# Patient Record
Sex: FEMALE | Race: BLACK OR AFRICAN AMERICAN | NOT HISPANIC OR LATINO | Employment: FULL TIME | URBAN - METROPOLITAN AREA
[De-identification: names, ages, dates, MRNs, and addresses within clinical notes are randomized per-mention and may not be internally consistent; named-entity substitution may affect disease eponyms.]

---

## 2023-02-23 LAB
CHLAMYDIA TRACH., AMPLIFIED: NEGATIVE
N. GONORRHEA, AMPLIFIED: NEGATIVE
TRICHOMONAS VAGINALIS: NEGATIVE

## 2023-04-04 ENCOUNTER — OFFICE VISIT (OUTPATIENT)
Dept: PRIMARY CARE | Facility: CLINIC | Age: 43
End: 2023-04-04
Payer: COMMERCIAL

## 2023-04-04 VITALS — SYSTOLIC BLOOD PRESSURE: 128 MMHG | DIASTOLIC BLOOD PRESSURE: 80 MMHG | WEIGHT: 293 LBS | BODY MASS INDEX: 49.07 KG/M2

## 2023-04-04 DIAGNOSIS — I10 HYPERTENSION, UNSPECIFIED TYPE: ICD-10-CM

## 2023-04-04 DIAGNOSIS — R79.89 LOW VITAMIN D LEVEL: ICD-10-CM

## 2023-04-04 DIAGNOSIS — R53.83 FATIGUE, UNSPECIFIED TYPE: Primary | ICD-10-CM

## 2023-04-04 DIAGNOSIS — E78.5 HYPERLIPIDEMIA, UNSPECIFIED HYPERLIPIDEMIA TYPE: ICD-10-CM

## 2023-04-04 DIAGNOSIS — N94.6 MENSTRUAL PAIN: ICD-10-CM

## 2023-04-04 DIAGNOSIS — F43.10 PTSD (POST-TRAUMATIC STRESS DISORDER): ICD-10-CM

## 2023-04-04 DIAGNOSIS — F41.9 ANXIETY DISORDER, UNSPECIFIED TYPE: ICD-10-CM

## 2023-04-04 DIAGNOSIS — T78.40XD ALLERGY, SUBSEQUENT ENCOUNTER: ICD-10-CM

## 2023-04-04 PROBLEM — D55.0 GLUCOSE-6-PHOSPHATE DEHYDROGENASE (G6PD) DEFICIENCY ANEMIA (CMS-HCC): Status: ACTIVE | Noted: 2023-04-04

## 2023-04-04 PROBLEM — T14.8XXA MUSCLE STRAIN: Status: ACTIVE | Noted: 2023-04-04

## 2023-04-04 PROBLEM — R73.9 HYPERGLYCEMIA: Status: ACTIVE | Noted: 2023-04-04

## 2023-04-04 PROBLEM — K59.00 CONSTIPATION: Status: ACTIVE | Noted: 2023-04-04

## 2023-04-04 PROBLEM — R11.0 MILD NAUSEA: Status: ACTIVE | Noted: 2023-04-04

## 2023-04-04 PROBLEM — R10.2 PELVIC PAIN: Status: ACTIVE | Noted: 2023-04-04

## 2023-04-04 PROBLEM — M54.50 LOW BACK PAIN: Status: ACTIVE | Noted: 2023-04-04

## 2023-04-04 PROBLEM — D64.9 ANEMIA: Status: ACTIVE | Noted: 2023-04-04

## 2023-04-04 PROBLEM — M85.80 OSTEOPENIA: Status: ACTIVE | Noted: 2023-04-04

## 2023-04-04 PROBLEM — U07.1 COVID-19 VIRUS DETECTED: Status: ACTIVE | Noted: 2023-04-04

## 2023-04-04 PROBLEM — R35.0 FREQUENT URINATION: Status: ACTIVE | Noted: 2023-04-04

## 2023-04-04 PROBLEM — L30.9 ECZEMA: Status: ACTIVE | Noted: 2023-04-04

## 2023-04-04 PROBLEM — M79.18 MYALGIA, OTHER SITE: Status: ACTIVE | Noted: 2023-04-04

## 2023-04-04 PROBLEM — M62.89 MUSCLE STIFFNESS: Status: ACTIVE | Noted: 2023-04-04

## 2023-04-04 PROBLEM — M54.2 CERVICALGIA: Status: ACTIVE | Noted: 2023-04-04

## 2023-04-04 PROBLEM — K60.2 ANAL FISSURE: Status: ACTIVE | Noted: 2023-04-04

## 2023-04-04 PROBLEM — M99.02 SEGMENTAL AND SOMATIC DYSFUNCTION OF THORACIC REGION: Status: ACTIVE | Noted: 2023-04-04

## 2023-04-04 PROBLEM — B37.31 YEAST VAGINITIS: Status: ACTIVE | Noted: 2023-04-04

## 2023-04-04 PROBLEM — M79.10 MUSCLE PAIN: Status: ACTIVE | Noted: 2023-04-04

## 2023-04-04 PROBLEM — M54.50 LUMBAGO WITHOUT SCIATICA: Status: ACTIVE | Noted: 2023-04-04

## 2023-04-04 PROBLEM — M99.03 SEGMENTAL AND SOMATIC DYSFUNCTION OF LUMBAR REGION: Status: ACTIVE | Noted: 2023-04-04

## 2023-04-04 PROBLEM — E66.01 MORBID OBESITY WITH BODY MASS INDEX (BMI) OF 40.0 OR HIGHER (MULTI): Status: ACTIVE | Noted: 2023-04-04

## 2023-04-04 PROBLEM — N89.8 VAGINAL DISCHARGE: Status: ACTIVE | Noted: 2023-04-04

## 2023-04-04 PROBLEM — N93.9 UTERINE BLEEDING: Status: ACTIVE | Noted: 2023-04-04

## 2023-04-04 PROBLEM — M54.2 NECK PAIN: Status: ACTIVE | Noted: 2023-04-04

## 2023-04-04 PROBLEM — R09.89 TENDER LYMPH NODE: Status: ACTIVE | Noted: 2023-04-04

## 2023-04-04 PROBLEM — R19.5 ABNORMAL STOOLS: Status: ACTIVE | Noted: 2023-04-04

## 2023-04-04 PROBLEM — L85.3 XEROSIS OF SKIN: Status: ACTIVE | Noted: 2023-04-04

## 2023-04-04 PROBLEM — R19.4 CHANGE IN BOWEL HABITS: Status: ACTIVE | Noted: 2023-04-04

## 2023-04-04 PROBLEM — K62.89 RECTAL PAIN: Status: ACTIVE | Noted: 2023-04-04

## 2023-04-04 PROBLEM — J30.9 ALLERGIC RHINITIS: Status: ACTIVE | Noted: 2023-04-04

## 2023-04-04 PROBLEM — M99.01 SEGMENTAL AND SOMATIC DYSFUNCTION OF CERVICAL REGION: Status: ACTIVE | Noted: 2023-04-04

## 2023-04-04 PROBLEM — M25.50 JOINT PAIN: Status: ACTIVE | Noted: 2023-04-04

## 2023-04-04 PROBLEM — F17.200 CURRENT SMOKER: Status: ACTIVE | Noted: 2023-04-04

## 2023-04-04 PROBLEM — K62.5 RECTAL BLEEDING: Status: ACTIVE | Noted: 2023-04-04

## 2023-04-04 PROCEDURE — 1036F TOBACCO NON-USER: CPT | Performed by: INTERNAL MEDICINE

## 2023-04-04 PROCEDURE — 99214 OFFICE O/P EST MOD 30 MIN: CPT | Performed by: INTERNAL MEDICINE

## 2023-04-04 PROCEDURE — 3079F DIAST BP 80-89 MM HG: CPT | Performed by: INTERNAL MEDICINE

## 2023-04-04 PROCEDURE — 3074F SYST BP LT 130 MM HG: CPT | Performed by: INTERNAL MEDICINE

## 2023-04-04 RX ORDER — HYDROCHLOROTHIAZIDE 25 MG/1
1 TABLET ORAL DAILY
COMMUNITY
Start: 2022-03-15

## 2023-04-04 RX ORDER — VIT C/E/ZN/COPPR/LUTEIN/ZEAXAN 250MG-90MG
25 CAPSULE ORAL DAILY
Qty: 90 CAPSULE | Refills: 3 | Status: SHIPPED | OUTPATIENT
Start: 2023-04-04

## 2023-04-04 RX ORDER — PHENYLEPHRINE HCL 10 MG/1
TABLET, FILM COATED ORAL
COMMUNITY
Start: 2021-10-26

## 2023-04-04 RX ORDER — FERROUS SULFATE 325(65) MG
65 TABLET ORAL
COMMUNITY
Start: 2021-03-16

## 2023-04-04 RX ORDER — PSYLLIUM HUSK 3.4 G/5.8G
POWDER ORAL
COMMUNITY
Start: 2021-09-21

## 2023-04-04 RX ORDER — VIT C/E/ZN/COPPR/LUTEIN/ZEAXAN 250MG-90MG
1 CAPSULE ORAL DAILY
COMMUNITY
Start: 2020-10-13 | End: 2023-04-04 | Stop reason: SDUPTHER

## 2023-04-04 RX ORDER — NAPROXEN 250 MG/1
250 TABLET ORAL
COMMUNITY
Start: 2021-03-25 | End: 2023-04-04 | Stop reason: SDUPTHER

## 2023-04-04 RX ORDER — NAPROXEN 250 MG/1
250 TABLET ORAL
Qty: 60 TABLET | Refills: 3 | Status: SHIPPED | OUTPATIENT
Start: 2023-04-04

## 2023-04-04 RX ORDER — DROSPIRENONE 4 MG/1
4 TABLET, FILM COATED ORAL DAILY
COMMUNITY

## 2023-04-04 RX ORDER — LORATADINE 10 MG/1
10 TABLET ORAL DAILY
Qty: 30 TABLET | Refills: 2 | Status: SHIPPED | OUTPATIENT
Start: 2023-04-04 | End: 2023-06-02

## 2023-04-04 NOTE — PROGRESS NOTES
Subjective   Patient ID: Miss Vee Morrison is a 42 y.o. female who presents for Back Pain.    HPI   42 years old female comes in to see me with multiple mental issues.  Anxiety, pain in the right groin, depression, pain in the groin goes down to the right leg and her back, very sad because of her situation today where she lost her apartment or home where she used to live then, her daughter is away, she is working as a health giver with the company.  She has no trauma, no falls and no history of any aggravating movement to hurt her right groin except lifting people or patient and twisting at times.  She is badly anxious and always having tears in her eyes.  We discussed her problem which started over 20 years ago when she gave birth to her daughter who is now 20 or 21 years old.  She felt hurt because her daughter came to her and told her that she is unable to come to her house or at home as most people do, she is down and depressed because of the death of her father which happened in November last year, she was able to go out of town since [place] she would be able to have some help, 4 months later she came back next when she lost her father.  She was back here in October 22.  I believe she is suffering from PTSD and she has a mental challenge and needs help from counseling, , psychology or psychiatry.  She agreed to that.  Review of Systems  12 systems reviewed and 12 systemS are negative  Objective   /80 (BP Location: Right arm, Patient Position: Sitting)   Wt (!) 138 kg (304 lb)   BMI 49.07 kg/m²     Physical Exam  On physical exam nonicteric jaundice or sclera.  Face symmetrical cranial nerves intact.  Neck supple no masses no lymph no thyromegaly except her neck is large questionable goiter.  Lungs clear no rales no wheezing.  Heart normal S1 and S2 regular rhythm.  Abdomen benign nontender no masses no organomegaly.  Neurological  exam intact.  I advised her that she should speak or meet social work to her may be from her main company where she is working, could be somebody from [facility], could be a psychologist or psychiatrist.  I offered her an antidepressant but she likes to wait.  Assessment/Plan   Diagnoses and all orders for this visit:  Fatigue, unspecified type  -     Thyroid Stimulating Hormone  Hypertension, unspecified type  -     Comprehensive Metabolic Panel  -     CBC  Hyperlipidemia, unspecified hyperlipidemia type  -     Lipid Panel  Menstrual pain  -     naproxen (Naprosyn) 250 mg tablet; Take 1 tablet (250 mg) by mouth in the morning and 1 tablet (250 mg) at noon and 1 tablet (250 mg) in the evening. Take with meals.  Allergy, subsequent encounter  -     loratadine (Claritin) 10 mg tablet; Take 1 tablet (10 mg) by mouth once daily.  Low vitamin D level  -     cholecalciferol (Vitamin D-3) 25 MCG (1000 UT) capsule; Take 1 capsule (25 mcg) by mouth once daily.  Anxiety disorder, unspecified type  PTSD (post-traumatic stress disorder)

## 2023-04-28 ENCOUNTER — APPOINTMENT (OUTPATIENT)
Dept: PRIMARY CARE | Facility: CLINIC | Age: 43
End: 2023-04-28
Payer: COMMERCIAL

## 2023-05-09 DIAGNOSIS — T78.40XD ALLERGY, SUBSEQUENT ENCOUNTER: ICD-10-CM

## 2023-05-16 ENCOUNTER — OFFICE VISIT (OUTPATIENT)
Dept: PRIMARY CARE | Facility: CLINIC | Age: 43
End: 2023-05-16
Payer: COMMERCIAL

## 2023-05-16 VITALS — BODY MASS INDEX: 48.58 KG/M2 | DIASTOLIC BLOOD PRESSURE: 76 MMHG | SYSTOLIC BLOOD PRESSURE: 120 MMHG | WEIGHT: 293 LBS

## 2023-05-16 DIAGNOSIS — F43.10 PTSD (POST-TRAUMATIC STRESS DISORDER): Primary | ICD-10-CM

## 2023-05-16 DIAGNOSIS — F32.A DEPRESSION, UNSPECIFIED DEPRESSION TYPE: ICD-10-CM

## 2023-05-16 DIAGNOSIS — Z71.89 ENCOUNTER FOR COUNSELING FOR CARE MANAGEMENT OF PATIENT WITH CHRONIC CONDITIONS AND COMPLEX HEALTH NEEDS USING NURSE-BASED MODEL: ICD-10-CM

## 2023-05-16 PROCEDURE — 1036F TOBACCO NON-USER: CPT | Performed by: INTERNAL MEDICINE

## 2023-05-16 PROCEDURE — 99213 OFFICE O/P EST LOW 20 MIN: CPT | Performed by: INTERNAL MEDICINE

## 2023-05-16 PROCEDURE — 3074F SYST BP LT 130 MM HG: CPT | Performed by: INTERNAL MEDICINE

## 2023-05-16 PROCEDURE — 3078F DIAST BP <80 MM HG: CPT | Performed by: INTERNAL MEDICINE

## 2023-05-16 NOTE — PROGRESS NOTES
Subjective   Patient ID: Miss Vee Morrison is a 42 y.o. female who presents for No chief complaint on file..    HPI   42 years old female comes in to see me following an update on her situation.  She was last seen here few weeks ago very depressed and very anxious, suffering from what sounds like PTSD.  She was under a lot of strain and pressure.  Did not know if she has a job or not if she could live at home or not.  She seek shelter with her friends when she could.  Not sure what kind of work she is doing now but she is much happier and looking much better.  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  Does not work in [place] and does not want to do that.  [place]  She had a good plan to work for what to do elderly wants to travel around and who could become her client or patient with whom she will be traveling and taking care of.  In any event she sounded better than last time.  Review of Systems  She still needs a counselor.  She will need to repair certain damages done in the past.  Review of systems are all negative.  Noncompliant with her medications.  Objective   /76 (BP Location: Left arm, Patient Position: Sitting, BP Cuff Size: Adult)   Wt 137 kg (301 lb)   BMI 48.58 kg/m²     Physical Exam  Alert oriented smiling in no distress no tears no cries.  Nonicteric sclera or jaundice.  Face symmetrical cranial nerves intact.  She weighed 301 pounds today.  Lost 3 pounds since last visit.  The pressure is within normal limit.  Neck supple no masses lymph no thyromegaly or jugular venous distention.  Lungs clear.  Heart normal S1 and S2 regular rhythm.  Abdomen benign neurologically intact.  Assessment/Plan   Diagnoses and all orders for this visit:  PTSD (post-traumatic stress disorder)  Depression, unspecified depression type  Encounter for counseling for care management of patient with chronic conditions and complex health needs using nurse-based model

## 2023-06-02 RX ORDER — LORATADINE 10 MG/1
TABLET ORAL
Qty: 30 TABLET | Refills: 2 | Status: SHIPPED | OUTPATIENT
Start: 2023-06-02

## 2023-06-20 ENCOUNTER — APPOINTMENT (OUTPATIENT)
Dept: PRIMARY CARE | Facility: CLINIC | Age: 43
End: 2023-06-20
Payer: COMMERCIAL

## 2024-02-26 ENCOUNTER — APPOINTMENT (OUTPATIENT)
Dept: OBSTETRICS AND GYNECOLOGY | Facility: CLINIC | Age: 44
End: 2024-02-26
Payer: COMMERCIAL